# Patient Record
Sex: FEMALE | Race: WHITE | NOT HISPANIC OR LATINO | Employment: FULL TIME | URBAN - METROPOLITAN AREA
[De-identification: names, ages, dates, MRNs, and addresses within clinical notes are randomized per-mention and may not be internally consistent; named-entity substitution may affect disease eponyms.]

---

## 2018-12-17 ENCOUNTER — TELEPHONE (OUTPATIENT)
Dept: OBSTETRICS AND GYNECOLOGY | Age: 23
End: 2018-12-17

## 2019-02-04 ENCOUNTER — OFFICE VISIT (OUTPATIENT)
Dept: OBSTETRICS AND GYNECOLOGY | Age: 24
End: 2019-02-04

## 2019-02-04 VITALS
DIASTOLIC BLOOD PRESSURE: 70 MMHG | SYSTOLIC BLOOD PRESSURE: 120 MMHG | BODY MASS INDEX: 20.04 KG/M2 | HEIGHT: 70 IN | WEIGHT: 140 LBS

## 2019-02-04 DIAGNOSIS — Z01.419 ENCOUNTER FOR GYNECOLOGICAL EXAMINATION WITHOUT ABNORMAL FINDING: Primary | ICD-10-CM

## 2019-02-04 DIAGNOSIS — Z13.89 SCREENING FOR BLOOD OR PROTEIN IN URINE: ICD-10-CM

## 2019-02-04 DIAGNOSIS — Z12.4 SCREENING FOR MALIGNANT NEOPLASM OF CERVIX: ICD-10-CM

## 2019-02-04 LAB
BILIRUB BLD-MCNC: NEGATIVE MG/DL
GLUCOSE UR STRIP-MCNC: NEGATIVE MG/DL
KETONES UR QL: NEGATIVE
LEUKOCYTE EST, POC: NEGATIVE
NITRITE UR-MCNC: NEGATIVE MG/ML
PH UR: 6 [PH] (ref 5–8)
PROT UR STRIP-MCNC: NEGATIVE MG/DL
RBC # UR STRIP: NEGATIVE /UL
SP GR UR: 1.02 (ref 1–1.03)
UROBILINOGEN UR QL: NORMAL

## 2019-02-04 PROCEDURE — 81002 URINALYSIS NONAUTO W/O SCOPE: CPT | Performed by: OBSTETRICS & GYNECOLOGY

## 2019-02-04 PROCEDURE — 99385 PREV VISIT NEW AGE 18-39: CPT | Performed by: OBSTETRICS & GYNECOLOGY

## 2019-02-04 RX ORDER — ERGOCALCIFEROL 1.25 MG/1
50000 CAPSULE ORAL
COMMUNITY

## 2019-02-04 NOTE — PROGRESS NOTES
"Subjective   Alaina Jones is a 23 y.o. female annual visit . 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 Periods regular now. Never been SA.   History of Present Illness    The following portions of the patient's history were reviewed and updated as appropriate: allergies, current medications, past family history, past medical history, past social history, past surgical history and problem list.    Review of Systems   Constitutional: Negative for chills and fever.   Gastrointestinal: Negative for abdominal distention and abdominal pain.   Genitourinary: Negative for dyspareunia, dysuria, menstrual problem, pelvic pain, vaginal discharge and vaginal pain.   All other systems reviewed and are negative.    /70   Ht 177.8 cm (70\")   Wt 63.5 kg (140 lb)   LMP 01/15/2019 (Approximate)   BMI 20.09 kg/m²     Objective   Physical Exam   Constitutional: She is oriented to person, place, and time. She appears well-developed and well-nourished.   Neck: Normal range of motion. Neck supple. No thyromegaly present.   Cardiovascular: Normal rate and regular rhythm.   Pulmonary/Chest: Effort normal and breath sounds normal. Right breast exhibits no mass, no nipple discharge, no skin change and no tenderness. Left breast exhibits no mass, no nipple discharge, no skin change and no tenderness.   Abdominal: Soft. Bowel sounds are normal. She exhibits no distension. There is no tenderness.   Genitourinary: Vagina normal and uterus normal. Pelvic exam was performed with patient supine. Uterus is not tender. Cervix exhibits no friability. Right adnexum displays no mass and no tenderness. Left adnexum displays no mass and no tenderness. No vaginal discharge found.   Musculoskeletal: Normal range of motion. She exhibits no edema.   Neurological: She is alert and oriented to person, place, and time.   Skin: Skin is warm and dry. No rash noted. "   Psychiatric: She has a normal mood and affect. Her behavior is normal.   Nursing note and vitals reviewed.        Assessment/Plan   Alaina was seen today for gynecologic exam.    Diagnoses and all orders for this visit:    Encounter for gynecological examination without abnormal finding    Screening for malignant neoplasm of cervix  -     IGP, Rfx Aptima HPV ASCU    Screening for blood or protein in urine  -     POC Urinalysis Dipstick      Counseling was given to patient for the following topics: risks and benefits of treatment options and contraceptive options .

## 2019-02-05 ENCOUNTER — TELEPHONE (OUTPATIENT)
Dept: OBSTETRICS AND GYNECOLOGY | Age: 24
End: 2019-02-05

## 2019-02-05 LAB
CONV .: NORMAL
CYTOLOGIST CVX/VAG CYTO: NORMAL
CYTOLOGY CVX/VAG DOC THIN PREP: NORMAL
DX ICD CODE: NORMAL
HIV 1 & 2 AB SER-IMP: NORMAL
OTHER STN SPEC: NORMAL
PATH REPORT.FINAL DX SPEC: NORMAL
STAT OF ADQ CVX/VAG CYTO-IMP: NORMAL

## 2019-02-05 NOTE — TELEPHONE ENCOUNTER
----- Message from Meredith Bourne MD sent at 2/5/2019  4:31 PM EST -----  Please call patient and notify of normal results of pap smear

## 2019-07-24 ENCOUNTER — CLINICAL SUPPORT (OUTPATIENT)
Dept: RETAIL CLINIC | Facility: CLINIC | Age: 24
End: 2019-07-24

## 2019-07-24 DIAGNOSIS — Z11.1 VISIT FOR TB SKIN TEST: Primary | ICD-10-CM

## 2019-07-24 PROCEDURE — 86580 TB INTRADERMAL TEST: CPT | Performed by: NURSE PRACTITIONER

## 2019-07-27 NOTE — PROGRESS NOTES
Patient returned for reading of TB skin test which was negative with 0 mm of induration. Form signed/scanned/returned to patient.

## 2019-08-13 ENCOUNTER — OFFICE VISIT (OUTPATIENT)
Dept: RETAIL CLINIC | Facility: CLINIC | Age: 24
End: 2019-08-13

## 2019-08-13 DIAGNOSIS — Z11.1 VISIT FOR TB SKIN TEST: Primary | ICD-10-CM

## 2019-08-13 PROCEDURE — 86580 TB INTRADERMAL TEST: CPT | Performed by: NURSE PRACTITIONER

## 2019-08-15 ENCOUNTER — IMMUNIZATION (OUTPATIENT)
Dept: RETAIL CLINIC | Facility: CLINIC | Age: 24
End: 2019-08-15

## 2019-08-15 DIAGNOSIS — Z23 NEED FOR TDAP VACCINATION: Primary | ICD-10-CM

## 2019-08-15 LAB
INDURATION: 0 MM (ref 0–10)
Lab: NORMAL
Lab: NORMAL
TB SKIN TEST: NEGATIVE

## 2019-10-25 ENCOUNTER — OFFICE VISIT (OUTPATIENT)
Dept: INTERNAL MEDICINE | Facility: CLINIC | Age: 24
End: 2019-10-25

## 2019-10-25 VITALS
DIASTOLIC BLOOD PRESSURE: 60 MMHG | SYSTOLIC BLOOD PRESSURE: 112 MMHG | HEART RATE: 71 BPM | HEIGHT: 70 IN | BODY MASS INDEX: 19.64 KG/M2 | OXYGEN SATURATION: 99 % | TEMPERATURE: 98.2 F | WEIGHT: 137.2 LBS

## 2019-10-25 DIAGNOSIS — R19.05 PERIUMBILICAL MASS: Primary | ICD-10-CM

## 2019-10-25 PROBLEM — M41.9 SCOLIOSIS: Status: ACTIVE | Noted: 2019-10-25

## 2019-10-25 PROCEDURE — 99203 OFFICE O/P NEW LOW 30 MIN: CPT | Performed by: NURSE PRACTITIONER

## 2019-10-25 RX ORDER — OMEGA-3 FATTY ACIDS/FISH OIL 300-1000MG
400 CAPSULE ORAL EVERY 6 HOURS PRN
COMMUNITY

## 2019-10-25 NOTE — PROGRESS NOTES
Subjective   Alaina Jones is a 24 y.o. female. Patient is here today for   Chief Complaint   Patient presents with   • Cyst     Pt is here as a new pt to [facility] care. Pt c/o cyst on abdomen X 1 week.   .    History of Present Illness   New patient here to establish care.  Health history and questionnaire have been reviewed in its entirety. The patient's previous primary care provider was her pediatrician.  C/o cyst to abdomen x 1 week. No change in size. Denies discomfort, fever, nausea, redness.  She was seen at [facility] urgent care and was told that she likely needed an ultrasound, but it was not something that they could do there.  She is requesting to have an ultrasound done.    The following portions of the patient's history were reviewed and updated as appropriate: allergies, current medications, past family history, past medical history, past social history, past surgical history and problem list.    Review of Systems   Constitutional: Negative.    Respiratory: Negative.    Cardiovascular: Negative.    Gastrointestinal:        Small palpable mass       Objective   Vitals:    10/25/19 1353   BP: 112/60   Pulse: 71   Temp: 98.2 °F (36.8 °C)   SpO2: 99%     Physical Exam   Constitutional: Vital signs are normal. She appears well-developed and well-nourished.   Cardiovascular: Normal rate, regular rhythm and normal heart sounds.   Pulmonary/Chest: Effort normal and breath sounds normal.   Abdominal: Soft. Normal appearance and bowel sounds are normal. There is no tenderness.       Skin: Skin is warm, dry and intact.   Psychiatric: She has a normal mood and affect. Her speech is normal and behavior is normal. Thought content normal.   Nursing note and vitals reviewed.      Assessment/Plan   Alaina was seen today for cyst.    Diagnoses and all orders for this visit:    Periumbilical mass  -     US Abdomen Complete      F/u as needed

## 2019-11-02 ENCOUNTER — HOSPITAL ENCOUNTER (OUTPATIENT)
Dept: ULTRASOUND IMAGING | Facility: HOSPITAL | Age: 24
Discharge: HOME | End: 2019-11-02
Admitting: NURSE PRACTITIONER

## 2019-11-02 PROCEDURE — 76700 US EXAM ABDOM COMPLETE: CPT

## 2019-11-05 ENCOUNTER — TELEPHONE (OUTPATIENT)
Dept: INTERNAL MEDICINE | Facility: CLINIC | Age: 24
End: 2019-11-05

## 2019-11-05 DIAGNOSIS — K46.9 HERNIA OF ABDOMINAL CAVITY: Primary | ICD-10-CM

## 2019-11-05 NOTE — TELEPHONE ENCOUNTER
PT AWARE.  REFERRAL SENT.    ----- Message from LI Todd sent at 11/5/2019  1:05 PM EST -----  Please let patient know that her ultrasound shows a probable small hernia. If she is having discomfort, I would refer her to general surgery.

## 2019-11-18 ENCOUNTER — OFFICE VISIT (OUTPATIENT)
Dept: SURGERY | Facility: CLINIC | Age: 24
End: 2019-11-18

## 2019-11-18 VITALS — BODY MASS INDEX: 20.53 KG/M2 | OXYGEN SATURATION: 98 % | HEIGHT: 69 IN | HEART RATE: 67 BPM | WEIGHT: 138.6 LBS

## 2019-11-18 DIAGNOSIS — K43.9 EPIGASTRIC HERNIA: Primary | ICD-10-CM

## 2019-11-18 PROCEDURE — 99203 OFFICE O/P NEW LOW 30 MIN: CPT | Performed by: SURGERY

## 2019-11-18 RX ORDER — ACETAMINOPHEN 500 MG
1000 TABLET ORAL EVERY 6 HOURS PRN
COMMUNITY

## 2019-11-18 NOTE — PROGRESS NOTES
Cc: Knot near umbilicus    History of presenting illness:   This is a very nice and very healthy thin 24-year-old female who says that around 3 weeks ago she noted a small knot centimeter or 2 above her umbilicus.  She had not done anything out of the usual.  She denies any associated pain.  It does not seem to change in size.  There is no radiation of any discomfort.  She denies any nausea or vomiting.  She has no prior history of any abdominal surgery.    Past Medical History: Scoliosis    Past Surgical History: Back surgery done around 5 years ago    Medications: Vitamin D    Allergies: None known    Social History: She is a non-smoker.  She uses alcohol occasionally.  She is a student in the occupational therapy program.    Family History: Negative for known colorectal cancer.    Review of Systems:  Constitutional: Negative for fever, chills, change in weight or appetite  Neck: no swollen glands or dysphagia or odynophagia  Respiratory: negative for SOB, cough, hemoptysis or wheezing  Cardiovascular: negative for chest pain, palpitations or peripheral edema  Gastrointestinal: Negative for nausea, vomiting, abdominal pain      Physical Exam:   Body mass index 20.5  General: alert and oriented, appropriate, no acute distress  Neck: Supple without lymphadenopathy or thyromegaly, trachea is in the midline  Respiratory: Lungs are clear bilaterally without wheezing, no use of accessory muscles is noted  Cardiovascular: Regular rate and rhythm without murmur, no peripheral edema  Gastrointestinal: Soft, benign, no tenderness.  A proximally 2 cm above the umbilicus there is a small nodule which is nontender.  There is no fascial defect appreciated.  There are no surgical scars.  There is no tenderness    Laboratory data: None    Imaging data: Ultrasound of the abdomen is nonspecific but could be consistent with a small epigastric hernia.      Assessment and plan:   -Likely asymptomatic small epigastric hernia  -Options  were discussed with the patient.  Given the fact that it is likely quite small and asymptomatic I told her that I thought surgical intervention, further imaging or observation alone would be appropriate.  For the time being the patient prefers observation alone.  She understands that if becomes bigger or more painful or if she simply wishes to have it evaluated further she is welcome to come back any time.  -In the interim, I recommended use of ibuprofen for any development that does become associated with this.  She may follow-up as needed.      Ismael Barakat MD, FACS  General, Minimally Invasive and Endoscopic Surgery  [facility]    [address], [address]  [place], [place], [address]  P: [phone]  F: [phone]

## 2021-03-05 ENCOUNTER — OFFICE VISIT (OUTPATIENT)
Dept: OBSTETRICS AND GYNECOLOGY | Age: 26
End: 2021-03-05

## 2021-03-05 VITALS
BODY MASS INDEX: 19.33 KG/M2 | SYSTOLIC BLOOD PRESSURE: 120 MMHG | WEIGHT: 135 LBS | DIASTOLIC BLOOD PRESSURE: 70 MMHG | HEIGHT: 70 IN

## 2021-03-05 DIAGNOSIS — Z01.419 ENCOUNTER FOR GYNECOLOGICAL EXAMINATION WITHOUT ABNORMAL FINDING: Primary | ICD-10-CM

## 2021-03-05 DIAGNOSIS — N92.6 IRREGULAR PERIODS: ICD-10-CM

## 2021-03-05 DIAGNOSIS — L70.8 OTHER ACNE: ICD-10-CM

## 2021-03-05 DIAGNOSIS — Z11.3 SCREEN FOR STD (SEXUALLY TRANSMITTED DISEASE): ICD-10-CM

## 2021-03-05 DIAGNOSIS — Z12.4 SCREENING FOR MALIGNANT NEOPLASM OF THE CERVIX: ICD-10-CM

## 2021-03-05 DIAGNOSIS — Z13.89 SCREENING FOR BLOOD OR PROTEIN IN URINE: ICD-10-CM

## 2021-03-05 LAB
BILIRUB BLD-MCNC: NEGATIVE MG/DL
GLUCOSE UR STRIP-MCNC: NEGATIVE MG/DL
KETONES UR QL: NEGATIVE
LEUKOCYTE EST, POC: NEGATIVE
NITRITE UR-MCNC: NEGATIVE MG/ML
PH UR: 5.5 [PH] (ref 5–8)
PROT UR STRIP-MCNC: NEGATIVE MG/DL
RBC # UR STRIP: ABNORMAL /UL
SP GR UR: 1.03 (ref 1–1.03)
UROBILINOGEN UR QL: NORMAL

## 2021-03-05 PROCEDURE — 99395 PREV VISIT EST AGE 18-39: CPT | Performed by: OBSTETRICS & GYNECOLOGY

## 2021-03-05 PROCEDURE — 81002 URINALYSIS NONAUTO W/O SCOPE: CPT | Performed by: OBSTETRICS & GYNECOLOGY

## 2021-03-05 RX ORDER — DROSPIRENONE AND ETHINYL ESTRADIOL 0.02-3(28)
1 KIT ORAL DAILY
Qty: 84 TABLET | Refills: 3 | Status: SHIPPED | OUTPATIENT
Start: 2021-03-05 | End: 2021-12-30

## 2021-03-05 NOTE — PROGRESS NOTES
"Subjective   Alaina Jones is a 25 y.o. female presents for  annual visit today.  Started period today , Periods are more irregular pt is under lots of stress / school , wants to start bcp  , started to be SA.  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 Plans to stay in [place].    I last saw her two years ago for annual visit . 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 Periods regular now. Never been SA.   History of Present Illness    The following portions of the patient's history were reviewed and updated as appropriate: allergies, current medications, past family history, past medical history, past social history, past surgical history and problem list.    Review of Systems   Constitutional: Negative for chills, fatigue and fever.   Gastrointestinal: Negative for abdominal distention and abdominal pain.   Genitourinary: Positive for menstrual problem. Negative for dyspareunia, dysuria, vaginal bleeding, vaginal discharge and vaginal pain.   All other systems reviewed and are negative.  /70   Ht 177.8 cm (70\")   Wt 61.2 kg (135 lb)   LMP 03/05/2021 (Exact Date)   Breastfeeding No   BMI 19.37 kg/m²       Objective   Physical Exam  Vitals signs and nursing note reviewed.   Constitutional:       Appearance: Normal appearance. She is well-developed and normal weight.   Neck:      Musculoskeletal: Normal range of motion and neck supple.      Thyroid: No thyromegaly.   Cardiovascular:      Rate and Rhythm: Normal rate and regular rhythm.   Pulmonary:      Effort: Pulmonary effort is normal.      Breath sounds: Normal breath sounds.   Chest:      Breasts:         Right: No mass, nipple discharge, skin change or tenderness.         Left: No mass, nipple discharge, skin change or tenderness.   Abdominal:      General: Bowel sounds are normal. There is no distension.      Palpations: Abdomen is soft.      Tenderness: There is no " abdominal tenderness.   Genitourinary:     General: Normal vulva.      Exam position: Supine.      Labia:         Right: No rash or lesion.         Left: No rash or lesion.       Vagina: Normal. No vaginal discharge.      Cervix: No friability, lesion or cervical bleeding.      Uterus: Not enlarged and not tender.       Adnexa:         Right: No mass or tenderness.          Left: No mass or tenderness.     Musculoskeletal: Normal range of motion.   Skin:     General: Skin is warm and dry.      Findings: No rash.   Neurological:      Mental Status: She is alert and oriented to person, place, and time.   Psychiatric:         Mood and Affect: Mood normal.         Behavior: Behavior normal.           Assessment/Plan   Diagnoses and all orders for this visit:    1. Encounter for gynecological examination without abnormal finding (Primary)    2. Screening for malignant neoplasm of the cervix  -     IGP, Rfx Aptima HPV ASCU    3. Screening for blood or protein in urine  -     POC Urinalysis Dipstick    4. Screen for STD (sexually transmitted disease)  -     NuSwab VG+ - Swab, Vagina    5. Irregular periods  -     drospirenone-ethinyl estradiol (KIEL) 3-0.02 MG per tablet; Take 1 tablet by mouth Daily.  Dispense: 84 tablet; Refill: 3    6. Other acne  -     drospirenone-ethinyl estradiol (KIEL) 3-0.02 MG per tablet; Take 1 tablet by mouth Daily.  Dispense: 84 tablet; Refill: 3      Counseling was given to patient for the following topics: instructions for management, risks and benefits of treatment options, importance of treatment compliance and self-breast exams .   Return in about 3 months (around 6/5/2021), or F/U BC.

## 2021-03-07 LAB
A VAGINAE DNA VAG QL NAA+PROBE: NORMAL SCORE
BVAB2 DNA VAG QL NAA+PROBE: NORMAL SCORE
C ALBICANS DNA VAG QL NAA+PROBE: NEGATIVE
C GLABRATA DNA VAG QL NAA+PROBE: NEGATIVE
C TRACH DNA VAG QL NAA+PROBE: NEGATIVE
MEGA1 DNA VAG QL NAA+PROBE: NORMAL SCORE
N GONORRHOEA DNA VAG QL NAA+PROBE: NEGATIVE
T VAGINALIS DNA VAG QL NAA+PROBE: NEGATIVE

## 2021-03-09 LAB
CONV .: NORMAL
CYTOLOGIST CVX/VAG CYTO: NORMAL
CYTOLOGY CVX/VAG DOC CYTO: NORMAL
CYTOLOGY CVX/VAG DOC THIN PREP: NORMAL
DX ICD CODE: NORMAL
HIV 1 & 2 AB SER-IMP: NORMAL
OTHER STN SPEC: NORMAL
STAT OF ADQ CVX/VAG CYTO-IMP: NORMAL

## 2021-03-12 ENCOUNTER — TELEPHONE (OUTPATIENT)
Dept: OBSTETRICS AND GYNECOLOGY | Age: 26
End: 2021-03-12

## 2021-04-16 ENCOUNTER — BULK ORDERING (OUTPATIENT)
Dept: CASE MANAGEMENT | Facility: OTHER | Age: 26
End: 2021-04-16

## 2021-04-16 DIAGNOSIS — Z23 IMMUNIZATION DUE: ICD-10-CM

## 2021-12-29 DIAGNOSIS — L70.8 OTHER ACNE: ICD-10-CM

## 2021-12-29 DIAGNOSIS — N92.6 IRREGULAR PERIODS: ICD-10-CM

## 2021-12-30 RX ORDER — DROSPIRENONE AND ETHINYL ESTRADIOL 0.02-3(28)
1 KIT ORAL DAILY
Qty: 84 TABLET | Refills: 0 | Status: SHIPPED | OUTPATIENT
Start: 2021-12-30 | End: 2022-03-28

## 2022-01-24 ENCOUNTER — OFFICE VISIT (OUTPATIENT)
Dept: OBSTETRICS AND GYNECOLOGY | Age: 27
End: 2022-01-24

## 2022-01-24 VITALS
SYSTOLIC BLOOD PRESSURE: 120 MMHG | DIASTOLIC BLOOD PRESSURE: 72 MMHG | HEIGHT: 70 IN | BODY MASS INDEX: 20.64 KG/M2 | WEIGHT: 144.2 LBS

## 2022-01-24 DIAGNOSIS — Z72.51 HIGH RISK HETEROSEXUAL BEHAVIOR: ICD-10-CM

## 2022-01-24 DIAGNOSIS — N89.8 VAGINAL DISCHARGE: Primary | ICD-10-CM

## 2022-01-24 DIAGNOSIS — N89.8 VAGINAL PRURITUS: ICD-10-CM

## 2022-01-24 DIAGNOSIS — Z11.3 SCREEN FOR STD (SEXUALLY TRANSMITTED DISEASE): ICD-10-CM

## 2022-01-24 PROCEDURE — 99214 OFFICE O/P EST MOD 30 MIN: CPT | Performed by: NURSE PRACTITIONER

## 2022-01-25 LAB
HBV SURFACE AG SERPL QL IA: NEGATIVE
HCV AB S/CO SERPL IA: <0.1 S/CO RATIO (ref 0–0.9)
HIV 1+2 AB+HIV1 P24 AG SERPL QL IA: NON REACTIVE
HSV1 IGG SER IA-ACNC: <0.91 INDEX (ref 0–0.9)
HSV2 IGG SER IA-ACNC: 3.44 INDEX (ref 0–0.9)
HSV2 IGG SERPL QL IA: POSITIVE
RPR SER QL: NON REACTIVE

## 2022-01-31 LAB
M HOMINIS SPEC QL CULT: NEGATIVE
U UREALYTICUM SPEC QL CULT: NEGATIVE

## 2022-02-02 PROBLEM — B00.9 HSV-2 (HERPES SIMPLEX VIRUS 2) INFECTION: Status: ACTIVE | Noted: 2022-02-02

## 2022-03-27 DIAGNOSIS — N92.6 IRREGULAR PERIODS: ICD-10-CM

## 2022-03-27 DIAGNOSIS — L70.8 OTHER ACNE: ICD-10-CM

## 2022-03-28 RX ORDER — DROSPIRENONE AND ETHINYL ESTRADIOL 0.02-3(28)
1 KIT ORAL DAILY
Qty: 84 TABLET | Refills: 3 | Status: SHIPPED | OUTPATIENT
Start: 2022-03-28 | End: 2023-02-28

## 2023-02-28 DIAGNOSIS — N92.6 IRREGULAR PERIODS: ICD-10-CM

## 2023-02-28 DIAGNOSIS — L70.8 OTHER ACNE: ICD-10-CM

## 2023-02-28 RX ORDER — DROSPIRENONE AND ETHINYL ESTRADIOL 0.02-3(28)
1 KIT ORAL DAILY
Qty: 84 TABLET | Refills: 3 | Status: SHIPPED | OUTPATIENT
Start: 2023-02-28 | End: 2024-02-28

## 2023-03-10 ENCOUNTER — TELEPHONE (OUTPATIENT)
Dept: OBSTETRICS AND GYNECOLOGY | Age: 28
End: 2023-03-10
Payer: COMMERCIAL

## 2023-03-10 RX ORDER — VALACYCLOVIR HYDROCHLORIDE 500 MG/1
TABLET, FILM COATED ORAL
Qty: 6 TABLET | Refills: 2 | Status: SHIPPED | OUTPATIENT
Start: 2023-03-10

## 2023-03-10 NOTE — TELEPHONE ENCOUNTER
Pt called stating she is needing valtrex called in due to having a herpes outbreak    Pharmacy verified     [phone]

## 2024-01-16 ENCOUNTER — TELEPHONE (OUTPATIENT)
Dept: OBSTETRICS AND GYNECOLOGY | Age: 29
End: 2024-01-16
Payer: COMMERCIAL

## 2024-01-16 NOTE — TELEPHONE ENCOUNTER
Patient stopping bcp will start using condoms for contraception , acne decreased . Wants to take a break from the pill.

## 2024-01-30 DIAGNOSIS — N92.6 IRREGULAR PERIODS: ICD-10-CM

## 2024-01-30 DIAGNOSIS — L70.8 OTHER ACNE: ICD-10-CM

## 2024-02-06 RX ORDER — DROSPIRENONE AND ETHINYL ESTRADIOL 0.02-3(28)
1 KIT ORAL DAILY
Qty: 30 TABLET | Refills: 2 | Status: SHIPPED | OUTPATIENT
Start: 2024-02-06 | End: 2024-05-06

## 2024-08-09 DIAGNOSIS — B00.9 HSV-2 (HERPES SIMPLEX VIRUS 2) INFECTION: ICD-10-CM

## 2024-08-13 RX ORDER — VALACYCLOVIR HYDROCHLORIDE 500 MG/1
500 TABLET, FILM COATED ORAL DAILY
Qty: 90 TABLET | Refills: 3 | Status: SHIPPED | OUTPATIENT
Start: 2024-08-13

## 2024-09-04 ENCOUNTER — OFFICE VISIT (OUTPATIENT)
Dept: OBSTETRICS AND GYNECOLOGY | Age: 29
End: 2024-09-04
Payer: COMMERCIAL

## 2024-09-04 VITALS
DIASTOLIC BLOOD PRESSURE: 70 MMHG | SYSTOLIC BLOOD PRESSURE: 118 MMHG | HEIGHT: 70 IN | WEIGHT: 146 LBS | BODY MASS INDEX: 20.9 KG/M2

## 2024-09-04 DIAGNOSIS — Z01.419 WELL WOMAN EXAM WITH ROUTINE GYNECOLOGICAL EXAM: Primary | ICD-10-CM

## 2024-09-04 DIAGNOSIS — Z12.4 SCREENING FOR CERVICAL CANCER: ICD-10-CM

## 2024-09-04 DIAGNOSIS — Z11.3 SCREEN FOR STD (SEXUALLY TRANSMITTED DISEASE): ICD-10-CM

## 2024-09-04 PROCEDURE — 99395 PREV VISIT EST AGE 18-39: CPT | Performed by: NURSE PRACTITIONER

## 2024-09-04 NOTE — PROGRESS NOTES
Subjective     Chief Complaint   Patient presents with   • Gynecologic Exam     Annual, last pap 2023  neg  CC:  no problems       History of Present Illness    Alaina Jones is a 29 y.o.  who presents for annual exam.    Doing well  Stopped OCP's in January   Periods have been irregular   She states her cycles have been about 6 weeks apart   Acne has been worse - will see derm in November and discuss  Not currently SA but has been. Used condoms. Ok with std screening today. Declines serum.  No other GYN concerns or complaints.  Obstetric History:  OB History          0    Para   0    Term   0       0    AB   0    Living   0         SAB   0    IAB   0    Ectopic   0    Molar   0    Multiple   0    Live Births   0               Menstrual History:     Patient's last menstrual period was 2024.         Current contraception: condoms  History of abnormal Pap smear: no  Received Gardasil immunization: yes  Perform regular self breast exam: no  Family history of uterine or ovarian cancer: no  Family History of colon cancer: no  Family history of breast cancer: no    Mammogram: not indicated.  Colonoscopy: not indicated.  DEXA: not indicated.    Exercise: moderately active - body weights, yoga, lap swims   Calcium/Vitamin D: adequate intake    The following portions of the patient's history were reviewed and updated as appropriate: allergies, current medications, past family history, past medical history, past social history, past surgical history, and problem list.    Review of Systems   Constitutional: Negative.    Respiratory: Negative.     Cardiovascular: Negative.    Gastrointestinal: Negative.    Genitourinary:  Positive for menstrual problem.   Skin: Negative.         acne   Psychiatric/Behavioral: Negative.             Objective   Physical Exam  Constitutional:       General: She is awake.      Appearance: Normal appearance. She is well-developed.   HENT:      Head: Normocephalic  and atraumatic.      Nose: Nose normal.   Neck:      Thyroid: No thyroid mass, thyromegaly or thyroid tenderness.   Cardiovascular:      Rate and Rhythm: Normal rate and regular rhythm.      Pulses: Normal pulses.      Heart sounds: Normal heart sounds.   Pulmonary:      Effort: Pulmonary effort is normal.      Breath sounds: Normal breath sounds.   Chest:   Breasts:     Breasts are symmetrical.      Right: Normal. No swelling, bleeding, inverted nipple, mass, nipple discharge, skin change or tenderness.      Left: Normal. No swelling, bleeding, inverted nipple, mass, nipple discharge, skin change or tenderness.   Abdominal:      General: Abdomen is flat. Bowel sounds are normal.      Palpations: Abdomen is soft.      Tenderness: There is no abdominal tenderness.   Genitourinary:     General: Normal vulva.      Labia:         Right: No rash, tenderness, lesion or injury.         Left: No rash, tenderness, lesion or injury.       Urethra: No prolapse, urethral pain, urethral swelling or urethral lesion.      Vagina: Normal. No signs of injury. No vaginal discharge, erythema, tenderness, bleeding, lesions or prolapsed vaginal walls.      Cervix: Normal. No discharge, friability, lesion, erythema or cervical bleeding.      Uterus: Normal. Not enlarged, not tender and no uterine prolapse.       Adnexa: Right adnexa normal and left adnexa normal.        Right: No mass, tenderness or fullness.          Left: No mass, tenderness or fullness.        Rectum: Normal. No mass.   Musculoskeletal:      Cervical back: Normal range of motion and neck supple.   Lymphadenopathy:      Upper Body:      Right upper body: No supraclavicular adenopathy.      Left upper body: No supraclavicular adenopathy.   Skin:     General: Skin is warm and dry.   Neurological:      General: No focal deficit present.      Mental Status: She is alert and oriented to person, place, and time.   Psychiatric:         Mood and Affect: Mood normal.          "Behavior: Behavior normal. Behavior is cooperative.         Thought Content: Thought content normal.         Judgment: Judgment normal.       /70   Ht 177.8 cm (70\")   Wt 66.2 kg (146 lb)   LMP 07/12/2024   BMI 20.95 kg/m²     Assessment & Plan   Diagnoses and all orders for this visit:    1. Well woman exam with routine gynecological exam (Primary)    2. Screening for cervical cancer  -     IGP,CtNgTv,rfx Aptima HPV ASCU    3. Screen for STD (sexually transmitted disease)  -     IGP,CtNgTv,rfx Aptima HPV ASCU        All questions answered.  Breast self exam technique reviewed and patient encouraged to perform self-exam monthly.  Discussed healthy lifestyle modifications.  Recommended 30 minutes of aerobic exercise five times per week.  Discussed calcium needs to prevent osteoporosis.    -pap and std screening  -F/u if irregular menses persists or if she goes longer than 3 months. Can plan pelvic US.               "

## 2024-09-06 ENCOUNTER — TELEPHONE (OUTPATIENT)
Dept: OBSTETRICS AND GYNECOLOGY | Age: 29
End: 2024-09-06

## 2024-09-06 LAB
C TRACH RRNA CVX QL NAA+PROBE: NEGATIVE
CONV .: NORMAL
CYTOLOGIST CVX/VAG CYTO: NORMAL
CYTOLOGY CVX/VAG DOC CYTO: NORMAL
CYTOLOGY CVX/VAG DOC THIN PREP: NORMAL
DX ICD CODE: NORMAL
Lab: NORMAL
N GONORRHOEA RRNA CVX QL NAA+PROBE: NEGATIVE
OTHER STN SPEC: NORMAL
STAT OF ADQ CVX/VAG CYTO-IMP: NORMAL
T VAGINALIS RRNA SPEC QL NAA+PROBE: NEGATIVE

## 2024-09-06 NOTE — TELEPHONE ENCOUNTER
Caller: Alaina Jones    Relationship: Self    Best call back number: [phone]    What is the best time to reach you: ANYTIME    Who are you requesting to speak with (clinical staff, provider,  specific staff member): CLINICAL     Do you know the name of the person who called: KASIA     What was the call regarding: LABS

## 2024-10-15 ENCOUNTER — OFFICE VISIT (OUTPATIENT)
Dept: INTERNAL MEDICINE | Facility: CLINIC | Age: 29
End: 2024-10-15
Payer: COMMERCIAL

## 2024-10-15 ENCOUNTER — PATIENT ROUNDING (BHMG ONLY) (OUTPATIENT)
Dept: INTERNAL MEDICINE | Facility: CLINIC | Age: 29
End: 2024-10-15
Payer: COMMERCIAL

## 2024-10-15 VITALS
DIASTOLIC BLOOD PRESSURE: 62 MMHG | SYSTOLIC BLOOD PRESSURE: 110 MMHG | HEIGHT: 70 IN | HEART RATE: 71 BPM | WEIGHT: 142 LBS | OXYGEN SATURATION: 98 % | BODY MASS INDEX: 20.33 KG/M2

## 2024-10-15 DIAGNOSIS — Z00.00 HEALTHCARE MAINTENANCE: Primary | ICD-10-CM

## 2024-10-15 DIAGNOSIS — F51.02 ADJUSTMENT INSOMNIA: ICD-10-CM

## 2024-10-15 PROCEDURE — 99395 PREV VISIT EST AGE 18-39: CPT | Performed by: NURSE PRACTITIONER

## 2024-10-15 PROCEDURE — 99213 OFFICE O/P EST LOW 20 MIN: CPT | Performed by: NURSE PRACTITIONER

## 2024-10-15 RX ORDER — TRETINOIN 0.1 MG/G
GEL TOPICAL
COMMUNITY
Start: 2020-09-14

## 2024-10-15 RX ORDER — TRAZODONE HYDROCHLORIDE 50 MG/1
50 TABLET, FILM COATED ORAL NIGHTLY
Qty: 30 TABLET | Refills: 1 | Status: SHIPPED | OUTPATIENT
Start: 2024-10-15

## 2024-10-15 NOTE — PROGRESS NOTES
October 15, 2024    Patient rounding obtained at checkout, patient stated she was referred by her mom and she did not have any problems making her appointment.  Patient stated her visit went good and yes she would refer us to friends and family.

## 2024-10-15 NOTE — PROGRESS NOTES
"Subjective   Alaina Jones is a 29 y.o. female and is here for a comprehensive physical exam. New patient here to establish care.  Health history and questionnaire have been reviewed in its entirety. The patient's previous primary care provider was her pediatrician.   The patient reports  :  Patient is her to discuss insomnia. She has trouble falling asleep due to anxiety. She doesn't feel anxious during the day.    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 Works at [employer].  She has tried Melatonin, magnesium, tea, no screen time, benadryl with some help to sleep, but it doesn't help consistently.    Do you take any herbs or supplements that were not prescribed by a doctor?  Vit d 5000 IU daily     History:  Sees gynecology  Dr. Meredith Bourne    The following portions of the patient's history were reviewed and updated as appropriate: allergies, current medications, past family history, past medical history, past social history, past surgical history and problem list.    Review of Systems  Do you have pain that bothers you in your daily life? no  Pertinent items are noted in HPI.    Objective   /62 (BP Location: Left arm, Patient Position: Sitting, Cuff Size: Small Adult)   Pulse 71   Ht 177.8 cm (70\")   Wt 64.4 kg (142 lb)   SpO2 98%   BMI 20.37 kg/m²     General Appearance:    Alert, cooperative, no distress, appears stated age   Head:    Normocephalic, without obvious abnormality, atraumatic   Eyes:    PERRL, conjunctiva/corneas clear, EOM's intact, both eyes   Ears:    Normal TM's and external ear canals, both ears   Nose:   Nares normal, septum midline, mucosa normal, no drainage    or sinus tenderness   Throat:   Lips, mucosa, and tongue normal; teeth and gums normal   Neck:   Supple, symmetrical, trachea midline, no adenopathy;     thyroid:  no enlargement/tenderness/nodules; no carotid    bruit   Back:     Symmetric, no curvature, ROM normal, no CVA " tenderness   Lungs:     Clear to auscultation bilaterally, respirations unlabored   Chest Wall:    No tenderness or deformity    Heart:    Regular rate and rhythm, S1 and S2 normal, no murmur       Abdomen:     Soft, non-tender, bowel sounds active all four quadrants,     no masses, no organomegaly           Extremities:   Extremities normal, atraumatic, no cyanosis or edema   Pulses:   2+ and symmetric all extremities   Skin:   Skin color, texture, turgor normal, no rashes or lesions   Lymph nodes:   Cervical, supraclavicular, and axillary nodes normal   Neurologic:   Grossly intact, normal strength, sensation and reflexes     throughout        Assessment & Plan   Healthy female exam.      1. Diagnoses and all orders for this visit:    1. Healthcare maintenance (Primary)  -     CBC (No Diff); Future  -     Comprehensive Metabolic Panel; Future  -     Lipid Panel With / Chol / HDL Ratio; Future  -     TSH; Future    2. Adjustment insomnia  -     traZODone (DESYREL) 50 MG tablet; Take 1 tablet by mouth Every Night.  Dispense: 30 tablet; Refill: 1    Insomnia - will try trazodone. F/u in one month    2. Patient Counseling:  --Nutrition: Stressed importance of moderation in sodium/caffeine intake, saturated fat and cholesterol, caloric balance, sufficient intake of fresh fruits, vegetables, fiber, calcium, iron.  --Exercise: Stressed the importance of regular exercise. Body weight, yoga, swim  --Dental health: Discussed importance of regular tooth brushing, flossing, and dental visits.  --Immunizations reviewed. Flu vaccine at work tomorrow    3. Discussed the patient's BMI with her.  The BMI is in the acceptable range  BMI is within normal parameters. No other follow-up for BMI required.     4. Follow up  in one month with fasting labs prior

## 2024-11-18 ENCOUNTER — OFFICE VISIT (OUTPATIENT)
Dept: INTERNAL MEDICINE | Facility: CLINIC | Age: 29
End: 2024-11-18
Payer: COMMERCIAL

## 2024-11-18 VITALS
DIASTOLIC BLOOD PRESSURE: 68 MMHG | HEART RATE: 75 BPM | OXYGEN SATURATION: 100 % | BODY MASS INDEX: 20.33 KG/M2 | HEIGHT: 70 IN | WEIGHT: 142 LBS | SYSTOLIC BLOOD PRESSURE: 94 MMHG

## 2024-11-18 DIAGNOSIS — F51.02 ADJUSTMENT INSOMNIA: ICD-10-CM

## 2024-11-18 PROCEDURE — 99214 OFFICE O/P EST MOD 30 MIN: CPT | Performed by: NURSE PRACTITIONER

## 2024-11-18 RX ORDER — TRAZODONE HYDROCHLORIDE 100 MG/1
100 TABLET ORAL NIGHTLY
Qty: 30 TABLET | Refills: 2 | Status: SHIPPED | OUTPATIENT
Start: 2024-11-18

## 2024-11-18 NOTE — PROGRESS NOTES
Subjective   Alaina Jones is a 29 y.o. female. Patient is here today for   Chief Complaint   Patient presents with    Follow-up     Patient is here for one month follow up with labs done prior.     Insomnia     Patient is following up on trazodone    Anxiety    Depression   .    History of Present Illness   Patient is here to follow up on insomnia. She was started on trazodone.  States that it does make her drowsy, but she is still not getting good sleep.  She does wake up in the middle the night.  She is going to therapy once a week.     She is also here to follow up on fasting labs.     The following portions of the patient's history were reviewed and updated as appropriate: allergies, current medications, past family history, past medical history, past social history, past surgical history and problem list.    Review of Systems    Objective     Vitals:    11/18/24 0825   BP: 94/68   Pulse: 75   SpO2: 100%     Body mass index is 20.37 kg/m².    Results Encounter on 11/05/2024   Component Date Value Ref Range Status    WBC 11/11/2024 4.17  3.40 - 10.80 10*3/mm3 Final    RBC 11/11/2024 4.15  3.77 - 5.28 10*6/mm3 Final    Hemoglobin 11/11/2024 12.4  12.0 - 15.9 g/dL Final    Hematocrit 11/11/2024 37.0  34.0 - 46.6 % Final    MCV 11/11/2024 89.2  79.0 - 97.0 fL Final    MCH 11/11/2024 29.9  26.6 - 33.0 pg Final    MCHC 11/11/2024 33.5  31.5 - 35.7 g/dL Final    RDW 11/11/2024 13.9  12.3 - 15.4 % Final    Platelets 11/11/2024 279  140 - 450 10*3/mm3 Final    Glucose 11/11/2024 91  65 - 99 mg/dL Final    BUN 11/11/2024 11  6 - 20 mg/dL Final    Creatinine 11/11/2024 0.94  0.57 - 1.00 mg/dL Final    EGFR Result 11/11/2024 85.5  >60.0 mL/min/1.73 Final    Comment: GFR Normal >60  Chronic Kidney Disease <60  Kidney Failure <15      BUN/Creatinine Ratio 11/11/2024 11.7  7.0 - 25.0 Final    Sodium 11/11/2024 139  136 - 145 mmol/L Final    Potassium 11/11/2024 4.1  3.5 - 5.2 mmol/L Final    Chloride 11/11/2024 104  98 -  107 mmol/L Final    Total CO2 11/11/2024 27.0  22.0 - 29.0 mmol/L Final    Calcium 11/11/2024 9.3  8.6 - 10.5 mg/dL Final    Total Protein 11/11/2024 7.1  6.0 - 8.5 g/dL Final    Albumin 11/11/2024 4.4  3.5 - 5.2 g/dL Final    Globulin 11/11/2024 2.7  gm/dL Final    A/G Ratio 11/11/2024 1.6  g/dL Final    Total Bilirubin 11/11/2024 0.5  0.0 - 1.2 mg/dL Final    Alkaline Phosphatase 11/11/2024 76  39 - 117 U/L Final    AST (SGOT) 11/11/2024 17  1 - 32 U/L Final    ALT (SGPT) 11/11/2024 12  1 - 33 U/L Final    Total Cholesterol 11/11/2024 171  0 - 200 mg/dL Final    Comment: Cholesterol Reference Ranges  (U.S[place] Department of Health and Human Services ATP III  Classifications)  Desirable          <200 mg/dL  Borderline High    200-239 mg/dL  High Risk          >240 mg/dL  Triglyceride Reference Ranges  (U.S. Department of Health and Human Services ATP III  Classifications)  Normal           <150 mg/dL  Borderline High  150-199 mg/dL  High             200-499 mg/dL  Very High        >500 mg/dL  HDL Reference Ranges  (U.S. Department of Health and Human Services ATP III  Classifications)  Low     <40 mg/dl (major risk factor for CHD)  High    >60 mg/dl ('negative' risk factor for CHD)  LDL Reference Ranges  (U.S. Department of Health and Human Services ATP III  Classifications)  Optimal          <100 mg/dL  Near Optimal     100-129 mg/dL  Borderline High  130-159 mg/dL  High             160-189 mg/dL  Very High        >189 mg/dL      Triglycerides 11/11/2024 47  0 - 150 mg/dL Final    HDL Cholesterol 11/11/2024 101 (H)  40 - 60 mg/dL Final    VLDL Cholesterol Farooq 11/11/2024 10  5 - 40 mg/dL Final    LDL Chol Calc (NIH) 11/11/2024 60  0 - 100 mg/dL Final    Chol/HDL Ratio 11/11/2024 1.69   Final    TSH 11/11/2024 1.740  0.270 - 4.200 uIU/mL Final     Reviewed labs with patient.     Physical Exam  Vitals and nursing note reviewed.   Constitutional:       Appearance: Normal appearance. She is well-developed.    Cardiovascular:      Rate and Rhythm: Normal rate and regular rhythm.      Heart sounds: Normal heart sounds.   Pulmonary:      Effort: Pulmonary effort is normal.      Breath sounds: Normal breath sounds.   Skin:     General: Skin is warm and dry.   Neurological:      Mental Status: She is alert and oriented to person, place, and time.   Psychiatric:         Speech: Speech normal.         Behavior: Behavior normal.         Thought Content: Thought content normal.         Assessment & Plan   Diagnoses and all orders for this visit:    1. Adjustment insomnia  -     traZODone (DESYREL) 100 MG tablet; Take 1 tablet by mouth Every Night.  Dispense: 30 tablet; Refill: 2      Will increase trazodone to 100 mg nightly.  She will let me know in a couple of weeks if that is not helping her.  May change to amitriptyline.  Follow-up in 6 months, but may be sooner if she needs a medication change.           Current Outpatient Medications:     acetaminophen (TYLENOL) 500 MG tablet, Take 2 tablets by mouth Every 6 (Six) Hours As Needed for Mild Pain., Disp: , Rfl:     clindamycin (CLEOCIN T) 1 % lotion, APPLY SMALL AMOUNT TOPICALLY TO FACE EVERY MORNING, Disp: , Rfl:     Ibuprofen 200 MG capsule, Take 400 mg by mouth Every 6 (Six) Hours As Needed., Disp: , Rfl:     traZODone (DESYREL) 100 MG tablet, Take 1 tablet by mouth Every Night., Disp: 30 tablet, Rfl: 2    tretinoin (RETIN-A) 0.01 % gel, , Disp: , Rfl:     valACYclovir (VALTREX) 500 MG tablet, TAKE 1 TABLET BY MOUTH DAILY, Disp: 90 tablet, Rfl: 3

## 2025-02-16 DIAGNOSIS — F51.02 ADJUSTMENT INSOMNIA: ICD-10-CM

## 2025-02-17 RX ORDER — TRAZODONE HYDROCHLORIDE 100 MG/1
100 TABLET ORAL NIGHTLY
Qty: 30 TABLET | Refills: 2 | Status: SHIPPED | OUTPATIENT
Start: 2025-02-17

## 2025-05-20 DIAGNOSIS — F51.02 ADJUSTMENT INSOMNIA: ICD-10-CM

## 2025-05-20 RX ORDER — TRAZODONE HYDROCHLORIDE 100 MG/1
100 TABLET ORAL NIGHTLY
Qty: 30 TABLET | Refills: 2 | Status: SHIPPED | OUTPATIENT
Start: 2025-05-20

## 2025-06-09 ENCOUNTER — OFFICE VISIT (OUTPATIENT)
Dept: INTERNAL MEDICINE | Facility: CLINIC | Age: 30
End: 2025-06-09
Payer: COMMERCIAL

## 2025-06-09 VITALS
BODY MASS INDEX: 20.89 KG/M2 | HEIGHT: 70 IN | HEART RATE: 61 BPM | WEIGHT: 145.9 LBS | OXYGEN SATURATION: 96 % | SYSTOLIC BLOOD PRESSURE: 102 MMHG | TEMPERATURE: 98.2 F | DIASTOLIC BLOOD PRESSURE: 58 MMHG

## 2025-06-09 DIAGNOSIS — F51.02 ADJUSTMENT INSOMNIA: Primary | ICD-10-CM

## 2025-06-09 PROCEDURE — 99213 OFFICE O/P EST LOW 20 MIN: CPT | Performed by: NURSE PRACTITIONER

## 2025-06-09 RX ORDER — TRAZODONE HYDROCHLORIDE 100 MG/1
100 TABLET ORAL NIGHTLY
Qty: 90 TABLET | Refills: 2 | Status: SHIPPED | OUTPATIENT
Start: 2025-06-09

## 2025-06-09 NOTE — PROGRESS NOTES
Subjective   Alaina Jones is a 30 y.o. female. Patient is here today for   Chief Complaint   Patient presents with    Anxiety    Insomnia   .    History of Present Illness   Patient is here to follow up on anxiety and insomnia. The last few weeks, she feels like the trazodone hasn't helped as much with her insomnia.   She is going to counseling weekly which does help.  She feels like anxiety and depression symptoms are okay during the day.  She tends to think about them more at night.  She is not wanting any increase in dosage or new medications at this time    The following portions of the patient's history were reviewed and updated as appropriate: allergies, current medications, past family history, past medical history, past social history, past surgical history and problem list.    Review of Systems    Objective   Vitals:    06/09/25 1118   BP: 102/58   Pulse: 61   Temp: 98.2 °F (36.8 °C)   SpO2: 96%     Body mass index is 20.93 kg/m².  Physical Exam  Vitals and nursing note reviewed.   Constitutional:       Appearance: Normal appearance. She is well-developed.   Cardiovascular:      Rate and Rhythm: Normal rate and regular rhythm.      Heart sounds: Normal heart sounds.   Pulmonary:      Effort: Pulmonary effort is normal.      Breath sounds: Normal breath sounds.   Skin:     General: Skin is warm and dry.   Neurological:      Mental Status: She is alert and oriented to person, place, and time.   Psychiatric:         Speech: Speech normal.         Behavior: Behavior normal.         Thought Content: Thought content normal.         Assessment & Plan   Diagnoses and all orders for this visit:    1. Adjustment insomnia (Primary)  -     traZODone (DESYREL) 100 MG tablet; Take 1 tablet by mouth Every Night.  Dispense: 90 tablet; Refill: 2      Will continue with trazodone nightly.  Follow-up in 6 months for a physical.  No fasting labs needed at that time

## 2025-08-18 DIAGNOSIS — B00.9 HSV-2 (HERPES SIMPLEX VIRUS 2) INFECTION: ICD-10-CM

## 2025-08-19 RX ORDER — VALACYCLOVIR HYDROCHLORIDE 500 MG/1
500 TABLET, FILM COATED ORAL DAILY
Qty: 90 TABLET | Refills: 3 | Status: SHIPPED | OUTPATIENT
Start: 2025-08-19

## 2025-08-25 DIAGNOSIS — F51.02 ADJUSTMENT INSOMNIA: Primary | ICD-10-CM
